# Patient Record
(demographics unavailable — no encounter records)

---

## 2024-11-26 NOTE — REASON FOR VISIT
[Subsequent Evaluation] : a subsequent evaluation for [Parent] : parent [FreeTextEntry2] : nasal fracture

## 2024-11-26 NOTE — HISTORY OF PRESENT ILLNESS
[de-identified] : Ms. WESTFALL is a 18 year female nasal fx in 2020, CRNF a week later with Plastics, she had a revision CRNF with same surgeon a few months later. states since nasal fx she has had L side obstruction and nose still appears crooked states she gets one sinus infection per month, she will get nasal congestion and pressure. 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

## 2024-11-26 NOTE — ASSESSMENT
[FreeTextEntry1] : Ms. WESTFALL is a 18 year female with L nasal obstruction since nasal trauma in 2020 despite use of nasal sprays. On exam caudal septal deflection L, bilateral inferior turbinate hypertrophy and nasal valve collapse + kamron b/l.  - d/w patient r/b/a of septoplasty / turbinate reduction / nvr with b/l spreaders / septal recon with possible MTF (may have native cartilage posteriorly), poss FESS (will obtain CT) - d/w patient intranasal splint for 1 week - postoperative instructions were discussed with the patient including no aerobic exercise for 2 weeks, no heavy lifting for 2 weeks - risks of bleeding and septal perforation were discussed and all questions answered - photos to be taken today - no cosmetic changes planned, we discussed that she feels that her nose looks crooked because her nasal tip deviates a few millimeters over to the left.  We discussed that this may be due to significant deflection of the underlying caudal septum-however I advised that the general appearance of her nose is overall fairly straight and I do not anticipate much change to the overall appearance of her nose as a result of the surgery.  In the end, I barely appreciate any asymmetry to the tip and I cannot guarantee there will be any appreciable further straightening as her nose already appears quite straight to me.  She understands.  -Once I receive the results of her CAT scan, we will call to discuss if there is any role for sinus surgery.  If her CAT scan is already quite clear-will start her on a sinus regimen postop to try to reduce the number of time she takes an antibiotic per year.  If her CT scan shows fluid trapped in the sinuses-then would likely address the sinuses at the same time to avoid a second anesthetic in the future.  Extensive discussion had regarding r/b/a of above procedure. all questions were answered

## 2024-11-26 NOTE — PROCEDURE
[FreeTextEntry6] : reason for exam: anterior rhinoscopy insufficient for symptom evaluation    Fiberoptic nasal endoscopy was performed.  R/b/a of procedure was explained to the patient and they agreed to proceed with procedure.  B/l inferior turbinate hypertrophy, severe with edematous mucosa.  Remainder of exam normal, including b/l middle turbinates, superior turbinates, inferior, middle and superior meati and sphenoethmoidal recess.  No nasal polyps.  CAUDAL DEFLECTION LEFT WITH FRACTURE     scope #: [id number]

## 2024-11-26 NOTE — PHYSICAL EXAM
[Nasal Endoscopy Performed] : nasal endoscopy was performed, see procedure section for findings [] : septum deviated to the left [Normal] : no abnormal secretions [de-identified] : caudal septal deflection L with fracture [de-identified] : nasal valve collapse + kamron b/l - bilateral inferior turbinate hypertrophy

## 2024-11-26 NOTE — END OF VISIT
[FreeTextEntry3] : I personally saw and examined ERI WESTFALL  in detail. I spoke to RIC Vasquez regarding the assessment and plan of care. I performed the procedures and relevant physical exam. I have made changes to the body of the note wherever necessary and appropriate.
Awake/Alert